# Patient Record
Sex: FEMALE
[De-identification: names, ages, dates, MRNs, and addresses within clinical notes are randomized per-mention and may not be internally consistent; named-entity substitution may affect disease eponyms.]

---

## 2018-04-24 ENCOUNTER — HOSPITAL ENCOUNTER (EMERGENCY)
Dept: HOSPITAL 88 - ER | Age: 73
Discharge: HOME | End: 2018-04-24
Payer: MEDICARE

## 2018-04-24 VITALS — BODY MASS INDEX: 31.28 KG/M2 | HEIGHT: 62 IN | WEIGHT: 170 LBS

## 2018-04-24 DIAGNOSIS — S86.112A: Primary | ICD-10-CM
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
## 2018-04-24 PROCEDURE — 99282 EMERGENCY DEPT VISIT SF MDM: CPT

## 2018-04-24 PROCEDURE — 93005 ELECTROCARDIOGRAM TRACING: CPT

## 2018-04-24 RX ADMIN — KETOROLAC TROMETHAMINE STA MG: 30 INJECTION, SOLUTION INTRAMUSCULAR; INTRAVENOUS at 22:40

## 2018-04-24 NOTE — XMS REPORT
Clinical Summary

 Created on: 2018



KelechiBon jang

External Reference #: [id number]

: 1945

Sex: Female



Demographics







 Address  [address]

[place]  [address]

 

 Home Phone  [phone]

 

 Preferred Language  English

 

 Marital Status  Unknown

 

 Jew Affiliation  Unknown

 

 Race  White

 

 Ethnic Group  Non-





Author







 Author  [facility]

 

 Organization  [facility]

 

 Address  Unknown

 

 Phone  Unavailable







Support







 Name  Relationship  Address  Phone

 

 No,Contact  [employer]  Unknown  [phone]







Care Team Providers







 Care Team Member Name  Role  Phone

 

 Jessica Randall MD  PCP  [phone]







Allergies







    



  Active Allergy   Reactions   Severity   Noted Date   Comments

 

    



  No Known Allergies     2017 

 

    



  Unable To Assess   Dermatitis   Medium   2016   Pt states her skin has



      burned from chemical



      reaction and is unsure



      what medications caused



      it







Current Medications







      



  Prescription   Sig.   Disp.   Refills   Start   End Date   Status



      Date  

 

      



  clobetasol (TEMOVATE)   Apply 1 application       Active



  0.05 % cream   topically 2 (two) times a     



   day.     

 

      



  losartan (COZAAR) 100 MG   Take 1 tablet (100 mg   90 tablet   3   20  
  Active



  tabletIndications:   total) by mouth daily.     17  



  Essential hypertension      

 

      



  TRUETEST TEST STRIPS   Use to check sugar once a   200 strip   3   20  
  Active



  strip test   day     17  



  stripsIndications: Type 2      



  diabetes mellitus with      



  diabetic nephropathy,      



  without long-term current      



  use of insulin      

 

      



  blood glucose control,   Use to set meter   1 each   0   20    Active



  low solutionIndications:      17  



  Type 2 diabetes mellitus      



  with diabetic      



  nephropathy, without      



  long-term current use of      



  insulin      

 

      



  blood-glucose meter (TRUE   Use to check sugar once a   1 each   0   20
    Active



  METRIX GLUCOSE METER)   day     17  



  miscIndications: Type 2      



  diabetes mellitus with      



  diabetic nephropathy,      



  without long-term current      



  use of insulin      

 

      



  lancing device with   Use to check blood sugar   200 each   3   20    
Active



  lancets kitIndications:   once a day     17  



  Type 2 diabetes mellitus      



  with diabetic      



  nephropathy, without      



  long-term current use of      



  insulin      

 

      



  lancets (TRUEPLUS   Use to check sugar daily   200 each   3   20    
Active



  LANCETS) 33 gauge      17  



  miscIndications: Type 2      



  diabetes mellitus with      



  diabetic nephropathy,      



  without long-term current      



  use of insulin      

 

      



  liraglutide (VICTOZA   Inject 0.3 mL (1.8 mg   3 pen   5   20    Active



  2-REYN) 0.6 mg/0.1 mL (18   total) under the skin     17  



  mg/3 mL) pen   daily with breakfast.     



  injectorIndications:      



  Uncontrolled type 1      



  diabetes mellitus with      



  other specified      



  complication      

 

      



  citalopram (CeleXA) 10 MG   Take 1 tablet (10 mg   30 tablet   11   20   Active



  tabletIndications:   total) by mouth daily.     17   18 



  Reactive depression      

 

      



  metFORMIN (GLUCOPHAGE)   Take 1 tablet (500 mg   60 tablet   11   20    
Active



  500 mg tablet   total) by mouth 2 (two)     17  



   times a day with meals.     

 

      



  TRUETEST TEST STRIPS      20   Discontin



  strip test strips      16   17   ued

 

      



  hydroquinone 4 %   Apply topically 2 (two)   28.35 g   0   20   



  creamIndications: Melasma   times a day.     16   17 

 

      



  liraglutide (VICTOZA   Inject 0.3 mL (1.8 mg   3 pen   5   20   Discontin



  2-RENY) 0.6 mg/0.1 mL (18   total) under the skin     16   17   ued



  mg/3 mL) pen   daily with breakfast.     



  injectorIndications: Type      



  II or unspecified type      



  diabetes mellitus without      



  mention of complication,      



  uncontrolled      

 

      



  atorvastatin (LIPITOR) 20   TAKE 1 TABLET (20 MG   30 tablet   2   20   Discontin



  MG tabletIndications:   TOTAL) BY MOUTH NIGHTLY.     16   17   ued



  Pure hypercholesterolemia      

 

      



  chlorthalidone (HYGROTEN)   Take 1/2 tablet daily for   30 tablet   0   20   Discontin



  25 MG tabletIndications:   blood pressure     16   17   ued



  Essential hypertension      

 

      



  fluticasone (FLONASE) 50   2 sprays by Each Nare   16 g   11   20   Discontin



  mcg/actuation nasal   route daily.     17   17   ued



  sprayIndications: Nasal      



  congestion      

 

      



  metFORMIN (GLUCOPHAGE)   Take 2 tablets (1,000 mg   180 tablet   3   04/10/20
   11/29/20   Discontin



  500 mg tabletIndications:   total) by mouth 2 (two)     17   17   ued



  Type 2 diabetes mellitus   times a day with meals.     



  with diabetic      



  nephropathy, without      



  long-term current use of      



  insulin      

 

      



  hydroCHLOROthiazide   Take 1 tablet (12.5 mg   90 tablet   3   20   Discontin



  (HYDRODIURIL) 12.5 MG   total) by mouth daily.     17   17   ued



  tabletIndications:      



  Essential hypertension      

 

      



  atorvastatin (LIPITOR) 20   Take 1 tablet (20 mg   90 tablet   3   20   



  MG tabletIndications:   total) by mouth daily for     17   17 



  Pure hypercholesterolemia   90 days. Default OP ins     

 

      



  liraglutide (VICTOZA   Inject 0.2 mL (1.2 mg   5 pen   11   10/09/20   11/29/
20   Discontin



  2-RENY) 0.6 mg/0.1 mL (18   total) under the skin     17   17   ued



  mg/3 mL) pen   daily with breakfast.     



  injectorIndications: Type      



  2 diabetes mellitus with      



  diabetic nephropathy,      



  without long-term current      



  use of insulin      







Active Problems







 



  Problem   Noted Date

 

 



  Chest tightness   2017

 

 



  Reactive depression   2017

 

 



  Numbness and tingling   2017

 

 



  Medicare annual wellness visit, subsequent   2017

 

 



  Impacted cerumen of right ear   2017

 

 



  Short-term memory loss   2017

 

 



  Polyp, stomach   2017

 

 



  Overview:



  Hyperplastic antrum polyp 8 mm



  - path inconclusive

 

 



  Non-intractable vomiting with nausea   2017

 

 



  Left hand tendonitis   2016

 

 



  Radial styloid tenosynovitis of left hand   10/10/2016

 

 



  Microcytic anemia   2016

 

 



  Thigh sprain   2016

 

 



  Melasma   2016

 

 



  Type 2 diabetes mellitus with diabetic nephropathy, without long-term   2016



  current use of insulin 

 

 



  Essential hypertension   2016

 

 



  Pure hypercholesterolemia   2016

 

 



  Atopic dermatitis   2016

 

 



  Iron deficiency anemia   2016

 

 



  CKD (chronic kidney disease) stage 3, GFR 30-59 ml/min   2016

 

 



  Hyperpigmentation   2016







Encounters







    



  Date   Type   Specialty   Care Team   Description

 

    



  2017   Telephone   Family Medicine   Luis Randall MD 

 

    



  2017   Office Visit   Family Medicine   Luis Randall MD   
Reactive depression



      (Primary Dx);



      Essential hypertension;



      Chest tightness;



      Type 2 diabetes mellitus



      with diabetic



      nephropathy, without



      long-term current use of



      insulin;



      Numbness and tingling

 

    



  11/10/2017   Documentation   Endocrinology   Maria Guadalupe Baldwin MA 

 

    



  10/09/2017   Office Visit   Endocrinology   Kodak Gardner MD   Type 2 
diabetes mellitus



     Rohith Cadena MD   with diabetic



      nephropathy, without



      long-term current use of



      insulin (Primary Dx);



      Healthcare maintenance;



      Essential hypertension;



      Mixed hyperlipidemia

 

    



  2017   Refill   Internal Medicine   Godfrey Engel,   Pure 
hypercholesterolemia



     MD 

 

    



  2017   Telephone   [facility]   Mable Dueñas MA 

 

    



  2017   Orders Only   Family Medicine   Luis Randall MD   Type 2 
diabetes mellitus



      with diabetic



      nephropathy, without



      long-term current use of



      insulin (Primary Dx);



      Uncontrolled type 1



      diabetes mellitus with



      other specified



      complication

 

    



  2017   Telephone   [facility]   Mable Dueñas MA 

 

    



  2017   Telephone   [facility]   Luis Randall MD   Type 2 
diabetes mellitus



      with diabetic



      nephropathy, without



      long-term current use of



      insulin (Primary Dx)

 

    



  2017   Refill   Internal Medicine   Godfrey Engel,   Type 2 
diabetes mellitus



     MD   with diabetic



      nephropathy, without



      long-term current use of



      insulin

 

    



  2017   Telephone   [facility]   Mable Dueñas MA   Type 2 
diabetes mellitus



      with diabetic



      nephropathy, without



      long-term current use of



      insulin (Primary Dx)

 

    



  2017   Office Visit   Family Luis Garcia MD   
Medicare annual wellness



      visit, subsequent



      (Primary Dx);



      Need for pneumococcal



      vaccination;



      Impacted cerumen of right



      ear;



      Short-term memory loss

 

    



  2017   Telephone   [facility]   Mable Dueñas MA 

 

    



  2017   Office Visit   Family Luis Garcia MD   Type 
2 diabetes mellitus



      with diabetic



      nephropathy, without



      long-term current use of



      insulin (Primary Dx);



      Essential hypertension;



      Non-intractable vomiting



      with nausea, unspecified



      vomiting type

 

    



  2017   Refill   Internal Medicine   Godfrey Engel,   Pure 
hypercholesterolemia



     MD 

 

    



  2017   Orders Only   Cardiology   Steffi James MA   Other 
chest pain

 

    



  2017   Orders Only   Family Medicine   Luis Randall MD   Type 2 
diabetes mellitus



      with diabetic



      nephropathy, without



      long-term current use of



      insulin (Primary Dx)

 

    



  2017   Lab   Lab   Abhilash Harding MD   Other chest pain;



      Type 2 diabetes mellitus



      with diabetic



      nephropathy, without



      long-term current use of



      insulin

 

    



  2017   Office Visit   Cardiology   Abhilash Harding MD   Other chest 
pain (Primary



      Dx);



      Type 2 diabetes mellitus



      with diabetic



      nephropathy, without



      long-term current use of



      insulin;



      Sinus bradycardia;



      Abnormal EKG



after 2017



Immunizations







  



  Name   Dates Previously Given   Next Due

 

  



  INFLUENZA QUAD PF   10/09/2017 

 

  



  Pneumococcal Conjugate   2017 



  13-Valent  







Family History







   



  Medical History   Relation   Name   Comments

 

   



  Diabetes   Brother  

 

   



  Diabetes   Brother  

 

   



  Heart attack   Brother  

 

   



  Diabetes   Father  

 

   



  Diabetes   Mother  

 

   



  Kidney disease   Mother  

 

   



  Diabetes   Sister  









   



  Relation   Name   Status   Comments

 

   



  Brother    Alive 

 

   



  Brother     

 

   



  Father     

 

   



  Maternal Grandfather     

 

   



  Maternal Grandmother     

 

   



  Mother     

 

   



  Paternal Grandfather     

 

   



  Paternal Grandmother     

 

   



  Sister    Alive 







Social History







    



  Tobacco Use   Types   Packs/Day   Years Used   Date

 

    



  Former Smoker   Cigarettes   0.25   5   Quit: 2006

 

    



  Smokeless Tobacco: Never   



  Used   









 Tobacco Cessation: Counseling Given: Yes











   



  Alcohol Use   Drinks/Week   oz/Week   Comments

 

   



  Yes   1 Standard   0.6   socially



   drinks or  



   equivalent  









 



  Sex Assigned at Birth   Date Recorded

 

 



  Not on file 







Last Filed Vital Signs







  



  Vital Sign   Reading   Time Taken

 

  



  Blood Pressure   158/88   2017  8:20 AM CST

 

  



  Pulse   72   2017  8:20 AM CST

 

  



  Temperature   36.9   C (98.4   F)   2017  8:20 AM CST

 

  



  Respiratory Rate   18   2017  8:20 AM CST

 

  



  Oxygen Saturation   100%   2017  8:20 AM CST

 

  



  Inhaled Oxygen   -   -



  Concentration  

 

  



  Weight   74.4 kg (164 lb)   2017  8:20 AM CST

 

  



  Height   154.9 cm (5' 1")   2017  8:20 AM CST

 

  



  Body Mass Index   30.99   2017  8:20 AM CST







Plan of Treatment







   



  Health Maintenance   Due Date   Last Done   Comments

 

   



  PNEUMOCOCCAL   2011  



  POLYSACCHARIDE VACCINE   



  AGE 65 AND OVER   

 

   



  INFLUENZA VACCINE   2018   10/09/2017, 2016 

 

   



  OPHTHALMOLOGY EXAM   2018 

 

   



  FOOT EXAM   2018, 2017 

 

   



  MAMMOGRAM   2018 

 

   



  COLONOSCOPY   09/10/2025   09/10/2015 

 

   



  ZOSTER VACCINE   Completed   2017 

 

   



  PNEUMOCOCCAL-13   Completed   2017, 2017 







Results

* ECG 12 lead (2017  9:08 AM)



Only the most recent of 2 results within the time period is included.





  



  Component   Value   Ref Range

 

  



  Ventricular rate   75 

 

  



  Atrial rate   75 

 

  



  TX interval   142 

 

  



  QRSD interval   78 

 

  



  QT interval   400 

 

  



  QTC interval   446 

 

  



  P axis 1   10 

 

  



  QRS axis 1   -30 

 

  



  T wave axis   33 

 

  



  EKG impression   Normal sinus rhythm-Left axis deviation-Abnormal 



   ECG-In automated comparison with ECG of 



   2017 12:34,-QRS axis shifted 



   left-Electronically Signed By Luis Randall MD 



   ([id number]) on 2018 8:36:39 AM 









 



  Specimen   Performing Laboratory

 

 



   [facility]



   [address] [place] [address]





* POC glycosylated hemoglobin (Hb A1C) (10/09/2017 10:10 AM)



Only the most recent of 2 results within the time period is included.





  



  Component   Value   Ref Range

 

  



  POC Hemoglobin A1C   8.2   %









 



  Specimen   Performing Laboratory

 

 



  Blood 





* POC glucose (10/09/2017  9:35 AM)





  



  Component   Value   Ref Range

 

  



  POC glucose   210Comment: nonfasting   65 - 100









 



  Specimen   Performing Laboratory

 

 



  Blood 





* Microalbumin / creatinine urine ratio (2017 10:10 AM)





  



  Component   Value   Ref Range

 

  



  Creatinine, urine, random   118   20 - 320 mg/dL

 

  



  Microalbumin, urine   2.0   See Note: mg/dL



   Comment: 



   Reference Range: 



   Reference Range 



   Not established 

 

  



  Microalbumin/creatinine   17   <30 mcg/mg creat



  ratio   Comment: 



   The ADA defines abnormalities in albumin 



   excretion as follows: 



   Category                 Result (mcg/mg 



   creatinine) 



   Normal                                        <30 



   Microalbuminuria                  



   Clinical albuminuria     > [id number] 



   The ADA recommends that at least two of three 



   specimens collected within a 3-6 month period be 



   abnormal before considering a patient to be 



   within a diagnostic category. 









 



  Specimen   Performing Laboratory

 

 



  Urine   [facility]









 Narrative

 

 



FASTING:YES





* Thyroid stimulating hormone (2017 10:10 AM)





  



  Component   Value   Ref Range

 

  



  TSH   3.11   0.40 - 4.50 mIU/L









 



  Specimen   Performing Laboratory

 

 



  Blood   [facility]









 Narrative

 

 



FASTING:YES





* Lipid panel (2017 10:10 AM)



Only the most recent of 2 results within the time period is included.





  



  Component   Value   Ref Range

 

  



  Cholesterol, total   140   125 - 200 mg/dL

 

  



  HDL cholesterol   44 (L)   > OR=46 mg/dL

 

  



  Triglycerides   147   <150 mg/dL

 

  



  LDL cholesterol   67   <130 mg/dL (calc)



  calculated   Comment: 



   Desirable range <100 mg/dL for patients with CHD 



   or 



   diabetes and <70 mg/dL for diabetic patients with 



   known heart disease. 

 

  



  Cholesterol/HDL ratio   3.2   < OR=5.0 (calc)

 

  



  Non-HDL cholesterol   96   mg/dL (calc)



   Comment: 



   Target for non-HDL cholesterol is 30 mg/dL higher 



   than 



   LDL cholesterol target. 









 



  Specimen   Performing Laboratory

 

 



  Blood   [facility]









 Narrative

 

 



FASTING:YES





* Comprehensive metabolic panel (2017 10:10 AM)



Only the most recent of 2 results within the time period is included.





  



  Component   Value   Ref Range

 

  



  Glucose   87   65 - 99 mg/dL



   Comment: 



   Fasting reference interval 

 

  



  BUN, whole blood   17   7 - 25 mg/dL

 

  



  Creatinine   1.04 (H)   0.60 - 0.93 mg/dL



   Comment: 



   For patients >49 years of age, the reference limit 



   for Creatinine is approximately 13% higher for 



   people 



   identified as -American. 

 

  



  EGFR Non-Afr. American   54 (L)   > OR=60 mL/min/1.73m2

 

  



  EGFR    63   > OR=60 mL/min/1.73m2

 

  



  BUN/creatinine ratio   16   6 - 22 (calc)

 

  



  Sodium   139   135 - 146 mmol/L

 

  



  Potassium   4.3   3.5 - 5.3 mmol/L

 

  



  Chloride   101   98 - 110 mmol/L

 

  



  CO2   25   20 - 31 mmol/L

 

  



  Calcium   9.7   8.6 - 10.4 mg/dL

 

  



  Protein   8.0   6.1 - 8.1 g/dL

 

  



  Albumin, S   4.6   3.6 - 5.1 g/dL

 

  



  Globulin, total   3.4   1.9 - 3.7 g/dL (calc)

 

  



  Albumin/globulin ratio   1.4   1.0 - 2.5 (calc)

 

  



  Total bilirubin   0.5   0.2 - 1.2 mg/dL

 

  



  Alkaline phosphatase   65   33 - 130 U/L

 

  



  AST   15   10 - 35 U/L

 

  



  ALT   10   6 - 29 U/L









 



  Specimen   Performing Laboratory

 

 



  Blood   [facility]









 Narrative

 

 



FASTING:YES





* Hemoglobin A1c (2017  2:19 PM)





  



  Component   Value   Ref Range

 

  



  Hemoglobin A1C   10.1 (H)   <5.7 % of total Hgb



   Comment: 



   For someone without known diabetes, a hemoglobin 



   A1c 



   value of 6.5% or greater indicates that they may 



   have 



   diabetes and this should be confirmed with a 



   follow-up 



   test. 



   For someone with known diabetes, a value <7% 



   indicates 



   that their diabetes is well controlled and a value

 



   greater than or equal to 7% indicates suboptimal 



   control. A1c targets should be individualized 



   based on 



   duration of diabetes, age, comorbid conditions, 



   and 



   other considerations. 



   Currently, no consensus exists regarding use of 



   hemoglobin A1c for diagnosis of diabetes for 



   children. 









 



  Specimen   Performing Laboratory

 

 



  Blood   [facility]





after 2017



Insurance







     



  Payer   Benefit   Subscriber ID   Type   Phone   Address



   Plan /    



   Group    

 

     



  [employer] MED[employer]ARE   BLUE   xxxxxxxxxxxx   HMO  



   MEDICARE    



   ADVANTAGE    



   HMO    









     



  Guarantor Name   Account   Relation to   Date of   Phone   Billing Address



   Type   Patient   Birth  

 

     



  BON ESPARZA   Personal/F   Self   1945   [address]   [address]



   amily     [phone]   [place] [address]